# Patient Record
(demographics unavailable — no encounter records)

---

## 2025-05-08 NOTE — HISTORY OF PRESENT ILLNESS
[FreeTextEntry1] : 82 yo male with history of gross hematuria that was worked up finding multiple bladder masses. His CT imaging demonstrated 2 bladder masses about 1.2-1.4 cm in size. He was scheduled for TURBT but insurance issues lead to his case being canceled.   He has history of BPH and underwent possible aqua ablation about 5 years ago. States more recently he has developed some urinary symptoms.

## 2025-05-08 NOTE — ASSESSMENT
[FreeTextEntry1] : 82 yo male with gross hematuria and CT urogram which identified 2 bladder masses.  He had been scheduled for TURBT however there was an issue with insurance and his case was canceled without the patient being notified.  Discussed with him the importance of obtaining a hard copy of the CT urogram for review and he will undergo cystoscopy next week.  Will also begin the process of scheduling him for TURBT discussed risks of the procedure including injury to structures like urethra prostate bladder ureteral orifices.  Discussed possibility of requiring a Valera catheter following the procedure.  He does have a history of BPH treated about 5 years ago now with recurrent symptoms.  Will start on tamsulosin and reassess his urinary symptoms at follow-up  Plan Urinalysis  Urine culture Urine cytology Schedule for turbt will undergo cystoscopy to assess bladder masses.  Trial tamsulosin 0.4 mg daily, prescription sent Will reassess urinary symptoms at his follow-up   Patient is being seen today for evaluation and management of a chronic and longitudinal ongoing condition and I am of the primary treating physician.

## 2025-06-06 NOTE — ASSESSMENT
[FreeTextEntry1] : 82 yo male with bladder mass sp turbt. We Discussed his pathology which was nonmuscle invasive bladder cancer papillary high-grade.  It was a large volume and we will discussed with the patient that likely was able to resect the whole tumor is recommended for high-grade with larger tumors to repeat a TURBT and ensure no residual bladder cancer has remained.  We again reviewed risks of the procedure including injury to the urethra bladder ureteral orifices infection bleeding.  Understands risks and would like to proceed with procedure  Plan Reviewed pathology - NMIBC papillary HG Urinalysis  Urine culture Schedule for restaging turbt Continue tamsulosin 0.4 mg daily, prescription sent   Patient is being seen today for evaluation and management of a chronic and longitudinal ongoing condition and I am of the primary treating physician.

## 2025-06-06 NOTE — HISTORY OF PRESENT ILLNESS
[FreeTextEntry1] : 82 yo male with history of gross hematuria that was worked up finding multiple bladder masses. He underwent TURBT and is here to discuss pathology  Path - NMIBC papillary HG  Discussed NMIBC and need for restaging TURBT

## 2025-07-17 NOTE — HISTORY OF PRESENT ILLNESS
[FreeTextEntry1] : 82 yo male with history of gross hematuria that was worked up finding multiple bladder masses.  Path - NMIBC papillary HG Repeat resection negative  We discussed repeat resection pathology and next steps for intravesical therapy.  Currently without urinary symptoms

## 2025-07-17 NOTE — ASSESSMENT
[FreeTextEntry1] : 82 yo male with nonmuscle invasive bladder cancer papillary high-grade.  His repeat resection was negative. We discussed intravesical therapy given HG. Patient will undergo induction course with instillations weekly for 6 weeks.   Plan Reviewed pathology - NMIBC papillary HG as well as repeat resection pathology - negative Urinalysis  Urine culture Continue tamsulosin 0.4 mg daily Will schedule for induction therapy    Patient is being seen today for evaluation and management of a chronic and longitudinal ongoing condition and I am of the primary treating physician.

## 2025-07-17 NOTE — ASSESSMENT
[FreeTextEntry1] : 82 yo male with nonmuscle invasive bladder cancer papillary high-grade.  His repeat resection was negative. We discussed intravesical therapy given HG. Patient will undergo induction course with instillations weekly for 6 weeks.   Plan Reviewed pathology - NMIBC papillary HG as well as repeat resection pathology - negative Urinalysis  Urine culture Continue tamsulosin 0.4 mg daily Will schedule for induction therapy    Patient is being seen today for evaluation and management of a chronic and longitudinal ongoing condition and I am of the primary treating physician.  (2) more than 100 beats/min

## 2025-07-17 NOTE — HISTORY OF PRESENT ILLNESS
[FreeTextEntry1] : 80 yo male with history of gross hematuria that was worked up finding multiple bladder masses.  Path - NMIBC papillary HG Repeat resection negative  We discussed repeat resection pathology and next steps for intravesical therapy.  Currently without urinary symptoms